# Patient Record
Sex: FEMALE | Race: WHITE | NOT HISPANIC OR LATINO | URBAN - METROPOLITAN AREA
[De-identification: names, ages, dates, MRNs, and addresses within clinical notes are randomized per-mention and may not be internally consistent; named-entity substitution may affect disease eponyms.]

---

## 2017-05-18 ENCOUNTER — OFFICE (OUTPATIENT)
Dept: URBAN - METROPOLITAN AREA CLINIC 75 | Facility: CLINIC | Age: 67
End: 2017-05-18

## 2017-05-18 VITALS
SYSTOLIC BLOOD PRESSURE: 132 MMHG | HEART RATE: 87 BPM | HEIGHT: 61 IN | WEIGHT: 191 LBS | DIASTOLIC BLOOD PRESSURE: 78 MMHG

## 2017-05-18 DIAGNOSIS — R14.3 FLATULENCE: ICD-10-CM

## 2017-05-18 DIAGNOSIS — K21.9 GASTRO-ESOPHAGEAL REFLUX DISEASE WITHOUT ESOPHAGITIS: ICD-10-CM

## 2017-05-18 DIAGNOSIS — K59.00 CONSTIPATION, UNSPECIFIED: ICD-10-CM

## 2017-05-18 DIAGNOSIS — Z12.11 ENCOUNTER FOR SCREENING FOR MALIGNANT NEOPLASM OF COLON: ICD-10-CM

## 2017-05-18 DIAGNOSIS — R14.0 ABDOMINAL DISTENSION (GASEOUS): ICD-10-CM

## 2017-05-18 PROCEDURE — 99203 OFFICE O/P NEW LOW 30 MIN: CPT | Performed by: INTERNAL MEDICINE

## 2017-05-18 NOTE — SERVICENOTES
records reviewed.  CMP within normal limits.  Hemoglobin 11.5. she tells me that she has thyroid studies 3 or 4 months ago, studies were normal according to the patient.

## 2017-05-18 NOTE — SERVICEHPINOTES
Thank you very much for allowing me to evaluate Ms. Rodgers. As you know she is a pleasant 66-year-old white female who has a history of hypertension. From a lower GI standpoint she also has constipation which is not new. She's had it for "a while". She uses MiraLax tonight in a row, she will been scheduled tonight and resume it. She takes it every night she social have multiple stools but seems to do well by skipping a day. There is no blood in the bowels. There is no weight loss but she is complaining of increased gas for the past year to year and a half. She will have excessive flatus, she can control it. There's been no obvious change in meds there's no change in diet. She tells me thyroid studies were fine. There is no blood in the bowels. Family history is negative for polyps, colitis or colon cancer. Her only abdominal surgery was a tubal ligation. She believes she had a colonoscopy 8-10 years ago.She also has a history of reflux. Her reflux is controlled on medical therapy. If she misses a dose she will have breakthrough symptoms. There is no dysphagia, odynophagia nausea or vomiting. She quit smoking and drinking over 20 years ago. She is in no distress, she does not look acutely ill.